# Patient Record
Sex: MALE | Race: WHITE | NOT HISPANIC OR LATINO | URBAN - METROPOLITAN AREA
[De-identification: names, ages, dates, MRNs, and addresses within clinical notes are randomized per-mention and may not be internally consistent; named-entity substitution may affect disease eponyms.]

---

## 2020-10-26 ENCOUNTER — LAB OUTSIDE AN ENCOUNTER (OUTPATIENT)
Dept: URBAN - METROPOLITAN AREA CLINIC 92 | Facility: CLINIC | Age: 60
End: 2020-10-26

## 2020-10-26 ENCOUNTER — OFFICE VISIT (OUTPATIENT)
Dept: URBAN - METROPOLITAN AREA CLINIC 92 | Facility: CLINIC | Age: 60
End: 2020-10-26
Payer: COMMERCIAL

## 2020-10-26 VITALS
HEART RATE: 64 BPM | TEMPERATURE: 95 F | DIASTOLIC BLOOD PRESSURE: 91 MMHG | SYSTOLIC BLOOD PRESSURE: 148 MMHG | WEIGHT: 176 LBS | BODY MASS INDEX: 26.07 KG/M2 | HEIGHT: 69 IN

## 2020-10-26 DIAGNOSIS — R74.8 ELEVATED LIVER ENZYMES: ICD-10-CM

## 2020-10-26 PROCEDURE — 82104 ALPHA-1-ANTITRYPSIN PHENO: CPT | Performed by: INTERNAL MEDICINE

## 2020-10-26 PROCEDURE — G8482 FLU IMMUNIZE ORDER/ADMIN: HCPCS | Performed by: INTERNAL MEDICINE

## 2020-10-26 PROCEDURE — 3017F COLORECTAL CA SCREEN DOC REV: CPT | Performed by: INTERNAL MEDICINE

## 2020-10-26 PROCEDURE — 99214 OFFICE O/P EST MOD 30 MIN: CPT | Performed by: INTERNAL MEDICINE

## 2020-10-26 PROCEDURE — 1036F TOBACCO NON-USER: CPT | Performed by: INTERNAL MEDICINE

## 2020-10-26 PROCEDURE — 80074 ACUTE HEPATITIS PANEL: CPT | Performed by: INTERNAL MEDICINE

## 2020-10-26 PROCEDURE — G8417 CALC BMI ABV UP PARAM F/U: HCPCS | Performed by: INTERNAL MEDICINE

## 2020-10-26 PROCEDURE — G8427 DOCREV CUR MEDS BY ELIG CLIN: HCPCS | Performed by: INTERNAL MEDICINE

## 2020-10-26 RX ORDER — BUTALBITAL, ACETAMINOPHEN, AND CAFFEINE 50; 300; 40 MG/1; MG/1; MG/1
CAPSULE ORAL
Qty: 0 | Refills: 0 | Status: ACTIVE | COMMUNITY
Start: 1900-01-01

## 2020-10-26 NOTE — HPI-TODAY'S VISIT:
Pt seen today for recent insurance exam showing mild elevation in AST 50 and ALT61. Normal alk phos, tbili and plts. MCV slightly elevated but hb and folate normal. States had elevated LFTs 20yrs ago with heavy etoh use. No hx of IV drugs or tattoos. Cont to drink 4 glasses of wine per day and uses lortab and fioricet daily.   Colonoscopy reviewed with polyps in 2019.

## 2020-10-29 LAB
ACTIN (SMOOTH MUSCLE) ANTIBODY: 4
ALBUMIN: 4.3
ALKALINE PHOSPHATASE: 97
ALPHA-1-ANTITRYPSIN, SERUM: 130
ALT (SGPT): 34
ANTINUCLEAR ANTIBODIES, IFA: NEGATIVE
AST (SGOT): 31
BILIRUBIN, DIRECT: 0.1
BILIRUBIN, TOTAL: 0.2
CERULOPLASMIN: 24
ENDOMYSIAL ANTIBODY IGA: NEGATIVE
FERRITIN, SERUM: 332
HBSAG SCREEN: NEGATIVE
HEP A AB, IGM: NEGATIVE
HEP B CORE AB, IGM: NEGATIVE
HEP C VIRUS AB: <0.1
IMMUNOGLOBULIN A, QN, SERUM: 357
Lab: (no result)
MITOCHONDRIAL (M2) ANTIBODY: <20
PHENOTYPE (PI): (no result)
PROTEIN, TOTAL: 6.8
T-TRANSGLUTAMINASE (TTG) IGA: <2

## 2020-11-02 ENCOUNTER — OFFICE VISIT (OUTPATIENT)
Dept: URBAN - METROPOLITAN AREA CLINIC 117 | Facility: CLINIC | Age: 60
End: 2020-11-02
Payer: COMMERCIAL

## 2020-11-02 DIAGNOSIS — K76.0 FATTY INFILTRATION OF LIVER: ICD-10-CM

## 2020-11-02 PROCEDURE — 76705 ECHO EXAM OF ABDOMEN: CPT | Performed by: INTERNAL MEDICINE

## 2020-11-02 RX ORDER — BUTALBITAL, ACETAMINOPHEN, AND CAFFEINE 50; 300; 40 MG/1; MG/1; MG/1
CAPSULE ORAL
Qty: 0 | Refills: 0 | Status: ACTIVE | COMMUNITY
Start: 1900-01-01

## 2020-12-08 ENCOUNTER — OFFICE VISIT (OUTPATIENT)
Dept: URBAN - METROPOLITAN AREA CLINIC 118 | Facility: CLINIC | Age: 60
End: 2020-12-08
Payer: COMMERCIAL

## 2020-12-08 DIAGNOSIS — K75.81 NASH (NONALCOHOLIC STEATOHEPATITIS): ICD-10-CM

## 2020-12-08 PROCEDURE — 3017F COLORECTAL CA SCREEN DOC REV: CPT | Performed by: INTERNAL MEDICINE

## 2020-12-08 PROCEDURE — 1036F TOBACCO NON-USER: CPT | Performed by: INTERNAL MEDICINE

## 2020-12-08 PROCEDURE — 99213 OFFICE O/P EST LOW 20 MIN: CPT | Performed by: INTERNAL MEDICINE

## 2020-12-08 PROCEDURE — G8417 CALC BMI ABV UP PARAM F/U: HCPCS | Performed by: INTERNAL MEDICINE

## 2020-12-08 PROCEDURE — G8427 DOCREV CUR MEDS BY ELIG CLIN: HCPCS | Performed by: INTERNAL MEDICINE

## 2020-12-08 PROCEDURE — G8482 FLU IMMUNIZE ORDER/ADMIN: HCPCS | Performed by: INTERNAL MEDICINE

## 2020-12-08 RX ORDER — BUTALBITAL, ACETAMINOPHEN, AND CAFFEINE 50; 300; 40 MG/1; MG/1; MG/1
CAPSULE ORAL
Qty: 0 | Refills: 0 | Status: ACTIVE | COMMUNITY
Start: 1900-01-01

## 2020-12-08 NOTE — HPI-TODAY'S VISIT:
The patient was last seen in 2019 for a routine colonoscopy.  He returns today for follow-up of abnormal liver enzymes.  This was noted this followed by his primary care doctor, Dr. Murray.  He was seen in our clinic by Dr. Workman, and serologies were ordered and were negative.  Of note he had an ultrasound that showed a mildly fatty liver but no signs of cirrhosis.  He does have a strong history of excess alcohol use, as well as obesity.  However since he was diagnosed with abnormal liver enzymes, he has stopped all alcohol and lost 15 pounds; his most recent liver serologies were within normal limits.  He denies any new medications, and has no history of hepatitis.  He has no right upper quadrant pain, anorexia, nausea or vomiting, itching, or jaundice.

## 2020-12-09 PROBLEM — 442685003: Status: ACTIVE | Noted: 2020-12-08

## 2021-04-08 ENCOUNTER — OFFICE VISIT (OUTPATIENT)
Dept: URBAN - METROPOLITAN AREA CLINIC 118 | Facility: CLINIC | Age: 61
End: 2021-04-08

## 2023-03-08 ENCOUNTER — WEB ENCOUNTER (OUTPATIENT)
Dept: URBAN - METROPOLITAN AREA CLINIC 70 | Facility: CLINIC | Age: 63
End: 2023-03-08

## 2023-03-08 ENCOUNTER — OFFICE VISIT (OUTPATIENT)
Dept: URBAN - METROPOLITAN AREA CLINIC 70 | Facility: CLINIC | Age: 63
End: 2023-03-08
Payer: COMMERCIAL

## 2023-03-08 ENCOUNTER — LAB OUTSIDE AN ENCOUNTER (OUTPATIENT)
Dept: URBAN - METROPOLITAN AREA CLINIC 70 | Facility: CLINIC | Age: 63
End: 2023-03-08

## 2023-03-08 VITALS
WEIGHT: 179.3 LBS | BODY MASS INDEX: 26.56 KG/M2 | SYSTOLIC BLOOD PRESSURE: 153 MMHG | DIASTOLIC BLOOD PRESSURE: 91 MMHG | HEIGHT: 69 IN | HEART RATE: 69 BPM

## 2023-03-08 DIAGNOSIS — R14.0 BLOATING: ICD-10-CM

## 2023-03-08 DIAGNOSIS — R10.9 ABDOMINAL PAIN, UNSPECIFIED ABDOMINAL LOCATION: ICD-10-CM

## 2023-03-08 PROBLEM — 197125005 IRRITABLE BOWEL SYNDROME WITH DIARRHEA: Status: ACTIVE | Noted: 2023-03-08

## 2023-03-08 PROCEDURE — 99214 OFFICE O/P EST MOD 30 MIN: CPT | Performed by: REGISTERED NURSE

## 2023-03-08 RX ORDER — LISINOPRIL 2.5 MG/1
1 TABLET TABLET ORAL ONCE A DAY
Status: ACTIVE | COMMUNITY

## 2023-03-08 RX ORDER — BUTALBITAL, ACETAMINOPHEN, AND CAFFEINE 50; 300; 40 MG/1; MG/1; MG/1
CAPSULE ORAL
Qty: 0 | Refills: 0 | Status: ACTIVE | COMMUNITY
Start: 1900-01-01

## 2023-03-08 NOTE — HPI-TODAY'S VISIT:
Pt c/o severe abdominal bloating, gas and diarrhea 1 week ago. Symptoms have gradually improved over the past week but he still has some generalized abdominal discomfort, gas and fatigue. No rectal bleeding. He denies any constipation prior to the episode. Colonoscopy 5/3/19 showed a hyperplastic polyp in the ascending colon and a tubular adenoma in the rectum(5 yr recall).

## 2023-04-12 ENCOUNTER — OFFICE VISIT (OUTPATIENT)
Dept: URBAN - METROPOLITAN AREA CLINIC 70 | Facility: CLINIC | Age: 63
End: 2023-04-12

## 2023-05-11 ENCOUNTER — LAB OUTSIDE AN ENCOUNTER (OUTPATIENT)
Dept: URBAN - METROPOLITAN AREA CLINIC 118 | Facility: CLINIC | Age: 63
End: 2023-05-11

## 2023-05-11 ENCOUNTER — OFFICE VISIT (OUTPATIENT)
Dept: URBAN - METROPOLITAN AREA CLINIC 118 | Facility: CLINIC | Age: 63
End: 2023-05-11
Payer: COMMERCIAL

## 2023-05-11 ENCOUNTER — DASHBOARD ENCOUNTERS (OUTPATIENT)
Age: 63
End: 2023-05-11

## 2023-05-11 ENCOUNTER — OFFICE VISIT (OUTPATIENT)
Dept: URBAN - METROPOLITAN AREA CLINIC 118 | Facility: CLINIC | Age: 63
End: 2023-05-11

## 2023-05-11 VITALS
TEMPERATURE: 97.5 F | HEART RATE: 61 BPM | BODY MASS INDEX: 25.95 KG/M2 | HEIGHT: 69 IN | WEIGHT: 175.2 LBS | SYSTOLIC BLOOD PRESSURE: 128 MMHG | DIASTOLIC BLOOD PRESSURE: 87 MMHG

## 2023-05-11 DIAGNOSIS — R10.9 ABDOMINAL PAIN, UNSPECIFIED ABDOMINAL LOCATION: ICD-10-CM

## 2023-05-11 DIAGNOSIS — Z87.19 HISTORY OF IBS: ICD-10-CM

## 2023-05-11 DIAGNOSIS — R19.4 CHANGE IN BOWEL HABITS: ICD-10-CM

## 2023-05-11 PROCEDURE — 99214 OFFICE O/P EST MOD 30 MIN: CPT | Performed by: INTERNAL MEDICINE

## 2023-05-11 RX ORDER — LISINOPRIL 2.5 MG/1
1 TABLET TABLET ORAL ONCE A DAY
Status: ACTIVE | COMMUNITY

## 2023-05-11 RX ORDER — BUTALBITAL, ACETAMINOPHEN, AND CAFFEINE 50; 300; 40 MG/1; MG/1; MG/1
CAPSULE ORAL
Qty: 0 | Refills: 0 | Status: ACTIVE | COMMUNITY
Start: 1900-01-01

## 2023-05-11 RX ORDER — DICYCLOMINE HYDROCHLORIDE 20 MG/1
1 TABLET TABLET ORAL
Qty: 90 | Refills: 5 | OUTPATIENT
Start: 2023-05-11 | End: 2023-11-06

## 2023-05-11 NOTE — HPI-TODAY'S VISIT:
pt presents for GI evaluation. pt reports h/o IBS for decades now reports 3 months 'severe' abdominal pain with gas, bloating now progressively worst. pt notes loose non-bloody stools with fecal urgency. pt does note feeling need to have bm up to 10 times a day though not always pass stools. No rectal bleeding. pt notes h/o H. pylori in the past. Denies prior diet or med changes. No recent antibiotics. No weight loss or anemia. Pt uptodate on colon cancer screening.

## 2023-05-12 LAB
ABSOLUTE BASOPHILS: 59
ABSOLUTE EOSINOPHILS: 172
ABSOLUTE LYMPHOCYTES: 2264
ABSOLUTE MONOCYTES: 667
ABSOLUTE NEUTROPHILS: 3439
ALBUMIN/GLOBULIN RATIO: 1.7
ALBUMIN: 4.7
ALKALINE PHOSPHATASE: 63
ALT: 19
AST: 17
BASOPHILS: 0.9
BILIRUBIN, TOTAL: 0.5
BUN/CREATININE RATIO: (no result)
CALCIUM: 9.6
CARBON DIOXIDE: 24
CHLORIDE: 100
CREATININE: 1.09
EGFR: 78
EOSINOPHILS: 2.6
FIB 4 INDEX: 0.72
GLOBULIN: 2.7
GLUCOSE: 74
HEMATOCRIT: 41.8
HEMOGLOBIN: 14.4
LIPASE: 7
LYMPHOCYTES: 34.3
MCH: 33
MCHC: 34.4
MCV: 95.7
MONOCYTES: 10.1
MPV: 8.6
NEUTROPHILS: 52.1
PLATELET COUNT: 323
PLATELET COUNT: 323
POTASSIUM: 4.4
PROTEIN, TOTAL: 7.4
RDW: 13.1
RED BLOOD CELL COUNT: 4.37
SODIUM: 136
UREA NITROGEN (BUN): 14
WHITE BLOOD CELL COUNT: 6.6

## 2023-05-17 LAB
CAMPYLOBACTER SPP. AG,EIA: (no result)
CLOSTRIDIUM DIFFICILE: (no result)
HELICOBACTER PYLORI AG, EIA, STOOL: (no result)
SALMONELLA AND SHIGELLA, CULTURE: (no result)
SHIGA TOXINS, EIA W/RFL TO E.COLI O157 CULTURE: (no result)

## 2025-02-17 ENCOUNTER — TELEPHONE ENCOUNTER (OUTPATIENT)
Dept: URBAN - METROPOLITAN AREA CLINIC 109 | Facility: CLINIC | Age: 65
End: 2025-02-17